# Patient Record
Sex: FEMALE | Race: WHITE
[De-identification: names, ages, dates, MRNs, and addresses within clinical notes are randomized per-mention and may not be internally consistent; named-entity substitution may affect disease eponyms.]

---

## 2019-04-06 ENCOUNTER — HOSPITAL ENCOUNTER (EMERGENCY)
Dept: HOSPITAL 91 - FTE | Age: 29
Discharge: HOME | End: 2019-04-06
Payer: SELF-PAY

## 2019-04-06 ENCOUNTER — HOSPITAL ENCOUNTER (EMERGENCY)
Dept: HOSPITAL 10 - FTE | Age: 29
Discharge: HOME | End: 2019-04-06
Payer: SELF-PAY

## 2019-04-06 VITALS — BODY MASS INDEX: 46.07 KG/M2 | HEIGHT: 55 IN | WEIGHT: 199.08 LBS

## 2019-04-06 VITALS — RESPIRATION RATE: 18 BRPM | DIASTOLIC BLOOD PRESSURE: 84 MMHG | SYSTOLIC BLOOD PRESSURE: 134 MMHG | HEART RATE: 93 BPM

## 2019-04-06 DIAGNOSIS — R05: Primary | ICD-10-CM

## 2019-04-06 DIAGNOSIS — J45.901: ICD-10-CM

## 2019-04-06 PROCEDURE — 99283 EMERGENCY DEPT VISIT LOW MDM: CPT

## 2019-04-06 PROCEDURE — 94664 DEMO&/EVAL PT USE INHALER: CPT

## 2019-04-06 RX ADMIN — ALBUTEROL SULFATE 1 MG: 2.5 SOLUTION RESPIRATORY (INHALATION) at 16:54

## 2019-04-06 NOTE — ERD
ER Documentation


Chief Complaint


Chief Complaint





COUGH SINCE YESTERDAY





HPI


28-year-old female presents with cough and wheezing since yesterday.  She has a 


history of intermittent asthma and has not needed an inhaler in 2 years.  She 


denies any fevers, vomiting or abdominal pain, chest pain.





ROS


All systems reviewed and are negative except as per history of present illness.





Medications


Home Meds


Active Scripts


Albuterol Sulfate* (Ventolin HFA*) 18 Gm Hfa.aer.ad, 2 PUFF INHALATION Q4H, #1 


INHALER


   Prov:SHIRA ANDRADE MD         4/6/19


Prednisone* (Prednisone*) 20 Mg Tab, 40 MG PO DAILY for 4 Days, TAB


   Start April 7, 2019


   Prov:SHIRA ANDRADE MD         4/6/19





Allergies


Allergies:  


Coded Allergies:  


     No Known Drug Allergy (Verified  Allergy, Unknown, 11/13/08)





PMhx/Soc


History of Surgery:  No


Anesthesia Reaction:  No


Hx Neurological Disorder:  No


Hx Respiratory Disorders:  Yes (ASTHMA)


Hx Cardiac Disorders:  No


Hx Psychiatric Problems:  No


Hx Miscellaneous Medical Probl:  No


Hx Alcohol Use:  No


Hx Substance Use:  No


Hx Tobacco Use:  No


Smoking Status:  Never smoker





FmHx


Family History:  No diabetes, No coronary disease, No other





Physical Exam


Vitals





Vital Signs


  Date      Temp   Pulse  Resp  B/P (MAP)   Pulse Ox  O2         O2 Flow    FiO2


Time                                                  Delivery   Rate


    4/6/19            86    18                    99                          21


     16:54


    4/6/19  100.2     93    18      134/84        99


     14:28                           (101)





Physical Exam


Const:   No acute distress


Head:   Atraumatic 


Eyes:    Normal Conjunctiva


ENT:    Normal External Ears, Nose and Mouth.  TMs and oropharynx normal.


Neck:               Full range of motion. No meningismus.


Resp:   Clear to auscultation bilaterally.  Mild wheeze without rales or 


retractions.


Cardio:   Regular rate and rhythm, no murmurs


Abd:    Soft, non tender, non distended. Normal bowel sounds


Skin:   No petechiae or rashes


Back:   No midline or flank tenderness


Ext:    No cyanosis, or edema


Neur:   Awake and alert


Psych:    Normal Mood and Affect


Results 24 hrs





Current Medications


 Medications
   Dose
          Sig/Tiffany
       Start Time
   Status  Last


 (Trade)       Ordered        Route
 PRN     Stop Time              Admin
Dose


                              Reason                                Admin


 Prednisone
    60 mg          ONCE  ONCE
    4/6/19        DC            4/6/19


(Prednisone)                  PO
            16:30
 4/6/19                16:29



                                             16:31


 Albuterol
     5 mg           ONCE  STAT
    4/6/19        DC            4/6/19


(Proventil
                   HHN
           16:20
 4/6/19                16:54



0.083% (Neb))                                16:22








Procedures/MDM


Patient given prednisone 60 mg mouth and albuterol treatment x1.  Patient had 


clear lungs without rales or retractions on serial exam.  Patient presents with 


your symptoms of mild wheezing without signs of hypoxemia, signs of pneumonia, r


espiratory distress.  Will treat with a refill of Ventolin, prednisone, primary 


care follow-up and return precautions.





Departure


Diagnosis:  


   Primary Impression:  


   Cough


Condition:  Stable


Patient Instructions:  Uri, Viral W/ Wheezing (Adult)


Referrals:  


DOCTOR,NOT ON STAFF (PCP)





Additional Instructions:  


Likely viral illness may last up to a week.  Recheck for new worsening symptoms 


with primary care doctor.











SHIRA ANDRADE MD              Apr 6, 2019 17:23